# Patient Record
Sex: MALE | Race: WHITE | NOT HISPANIC OR LATINO | Employment: FULL TIME | URBAN - METROPOLITAN AREA
[De-identification: names, ages, dates, MRNs, and addresses within clinical notes are randomized per-mention and may not be internally consistent; named-entity substitution may affect disease eponyms.]

---

## 2024-09-04 ENCOUNTER — OFFICE VISIT (OUTPATIENT)
Dept: CARDIOLOGY | Facility: CLINIC | Age: 54
End: 2024-09-04
Payer: COMMERCIAL

## 2024-09-04 VITALS
WEIGHT: 195 LBS | BODY MASS INDEX: 30.61 KG/M2 | HEART RATE: 61 BPM | OXYGEN SATURATION: 98 % | DIASTOLIC BLOOD PRESSURE: 90 MMHG | SYSTOLIC BLOOD PRESSURE: 140 MMHG | HEIGHT: 67 IN

## 2024-09-04 DIAGNOSIS — R55 POSTURAL DIZZINESS WITH PRESYNCOPE: ICD-10-CM

## 2024-09-04 DIAGNOSIS — R06.02 SOB (SHORTNESS OF BREATH): Primary | ICD-10-CM

## 2024-09-04 DIAGNOSIS — R42 POSTURAL DIZZINESS WITH PRESYNCOPE: ICD-10-CM

## 2024-09-04 DIAGNOSIS — G47.33 OSA (OBSTRUCTIVE SLEEP APNEA): ICD-10-CM

## 2024-09-04 DIAGNOSIS — I10 ESSENTIAL HYPERTENSION: ICD-10-CM

## 2024-09-04 PROCEDURE — 99204 OFFICE O/P NEW MOD 45 MIN: CPT | Performed by: INTERNAL MEDICINE

## 2024-09-04 RX ORDER — HYDROCHLOROTHIAZIDE 12.5 MG/1
12.5 CAPSULE ORAL DAILY
Qty: 90 CAPSULE | Refills: 3 | Status: SHIPPED | OUTPATIENT
Start: 2024-09-04

## 2024-09-04 RX ORDER — ESCITALOPRAM OXALATE 10 MG/1
1 TABLET ORAL DAILY
COMMUNITY
Start: 2024-07-01

## 2024-09-04 RX ORDER — HYDROCHLOROTHIAZIDE 12.5 MG/1
12.5 CAPSULE ORAL DAILY
Qty: 90 CAPSULE | Refills: 3 | Status: SHIPPED | OUTPATIENT
Start: 2024-09-04 | End: 2024-09-04 | Stop reason: SDUPTHER

## 2024-09-04 RX ORDER — AMLODIPINE BESYLATE 5 MG/1
1 TABLET ORAL DAILY
COMMUNITY
Start: 2024-08-27 | End: 2024-09-04

## 2024-09-04 RX ORDER — LISINOPRIL 20 MG/1
20 TABLET ORAL EVERY MORNING
COMMUNITY
Start: 2024-07-29

## 2024-09-04 NOTE — PROGRESS NOTES
PATIENTINFORMATION    Date of Office Visit: 2024  Encounter Provider: Kenny Bravo MD  Place of Service: [facility] CARDIOLOGY  Patient Name: Mala Dillon  : 1970    Subjective:     Encounter Date:2024      Patient ID: Mala Dillon is a 54 y.o. male.    Chief Complaint   Patient presents with    Chest Pain    Hypertension       HPI  Mr. Dillon is a pleasant 55 yo gentleman who came to cardiology clinic for evaluation of chest discomfort and essential hypertension.  He was started on lisinopril few months ago for elevated blood pressure and amlodipine added later because of persistently elevated blood pressure.  In August lisinopril dose was increased from 20 mg to 40 mg in same day he became hypotensive with symptoms that prompted ER visit.  He was given IV fluids and symptoms resolved and he was resumed on lisinopril 20 mg.  He reports intermittent daily dizzy spells and he reports blood pressure being elevated during episodes.  He has to take a break from work because of dizzy spells.  He denies symptoms being presyncopal.  He denies syncope.  He also reports intermittent exertional chest tightness for the past several months and persistent daytime fatigue.  His wife reports he has been snoring more and he reports sleep being nonrefreshing.  Blood pressure persistently high during home monitoring.  He denies orthopnea, PND, palpitations .  He reports some mild leg swelling since after starting amlodipine.  No prior coronary angiogram or diagnose of CAD.    He has family history of CAD.    He is cutting back on tobacco.  He tried Chantix but he reports it made dizzy spells worse and his blood pressure going up.      ROS  All systems reviewed and negative except as noted in HPI.    History reviewed. No pertinent past medical history.    History reviewed. No pertinent surgical history.    Social History     Socioeconomic History    Marital status:    Tobacco Use  "   Smoking status: Every Day     Types: Cigarettes    Smokeless tobacco: Never   Vaping Use    Vaping status: Never Used       History reviewed. No pertinent family history.        ECG 12 Lead    Date/Time: 9/5/2024 8:52 AM  Performed by: Kenny Bravo MD    Authorized by: Kenny Bravo MD  Comparison: not compared with previous ECG   Rhythm: sinus rhythm  Rate: normal  Conduction: conduction normal  ST Segments: ST segments normal  T Waves: T waves normal  QRS axis: normal  Other: no other findings    Clinical impression: normal ECG             Objective:     /90   Pulse 61   Ht 170.2 cm (67\")   Wt 88.5 kg (195 lb)   SpO2 98%   BMI 30.54 kg/m²  Body mass index is 30.54 kg/m².     Constitutional:       General: Not in acute distress.     Appearance: Well-developed. Not diaphoretic.   Eyes:      Pupils: Pupils are equal, round, and reactive to light.   HENT:      Head: Normocephalic and atraumatic.   Neck:      Thyroid: No thyromegaly.   Pulmonary:      Effort: Pulmonary effort is normal. No respiratory distress.      Breath sounds: Normal breath sounds. No wheezing. No rales.   Chest:      Chest wall: Not tender to palpatation.   Cardiovascular:      Normal rate. Regular rhythm.      No gallop.    Pulses:     Intact distal pulses.   Edema:     Peripheral edema absent.   Abdominal:      General: Bowel sounds are normal. There is no distension.      Palpations: Abdomen is soft.      Tenderness: There is no guarding.   Musculoskeletal: Normal range of motion.         General: No deformity.      Cervical back: Normal range of motion and neck supple. Skin:     General: Skin is warm and dry.      Findings: No rash.   Neurological:      Mental Status: Alert and oriented to person, place, and time.      Cranial Nerves: No cranial nerve deficit.      Deep Tendon Reflexes: Reflexes are normal and symmetric.   Psychiatric:         Judgment: Judgment normal.       Review Of Data: I have reviewed " pertinent recent labs, images and documents and pertinent findings included in HPI or assessment below.          Assessment/Plan:     Mr. Dillon came in for evaluation of elevated blood pressure, fatigue, exertional shortness of breath/chest discomfort and dizzy spells.  Orthostatic vital signs were normal during office visit.  Exertional shortness of breath/fatigue/chest discomfort-he reports an normal exercise myocardial perfusion study at [facility] in [place].  He walked for almost 10 minutes.  Target heart rate not achieved but no evidence for ischemia.  Get echocardiogram.  Lipid study ordered.  I will get 48-hour Holter.  Bradycardic at baseline with heart rate in the 50s but no evidence for block.  Essential hypertension-leg swelling with amlodipine.  I have switched amlodipine with hydrochlorothiazide.  Dizzy spell-no evidence for orthostatic hypotension.  Tobacco use -currently cutting back on tobacco.  Reported elevated BP with Chantix.  But no mood changes.May restart Chantix     I have encouraged him to start exercising regularly.  DASH diet pamphlet provided  Recommendations to follow after reviewing echocardiogram, Holter, basic labs and sleep study.  Diagnosis and plan of care discussed with patient and verbalized understanding.            Your medication list            Accurate as of September 4, 2024 11:59 PM. If you have any questions, ask your nurse or doctor.                START taking these medications        Instructions Last Dose Given Next Dose Due   hydroCHLOROthiazide 12.5 MG capsule  Commonly known as: MICROZIDE  Started by: Kenny Bravo      Take 1 capsule by mouth Daily.              CONTINUE taking these medications        Instructions Last Dose Given Next Dose Due   escitalopram 10 MG tablet  Commonly known as: LEXAPRO      Take 1 tablet by mouth Daily.       lisinopril 20 MG tablet  Commonly known as: PRINIVIL,ZESTRIL      Take 1 tablet by mouth Every Morning.               STOP taking these medications      amLODIPine 5 MG tablet  Commonly known as: NORVASC  Stopped by: Kenny Bravo                  Where to Get Your Medications        These medications were sent to [facility]/pharmacy #[id number] - [place], [place] - [address] - [phone] PH - [phone] FX  [address] [place] [address] [place] [address]      Phone: [phone]   hydroCHLOROthiazide 12.5 MG capsule             Kenny Bravo MD  09/05/24  08:53 EDT

## 2024-09-05 PROBLEM — I10 ESSENTIAL HYPERTENSION: Status: ACTIVE | Noted: 2024-09-05

## 2024-09-05 PROCEDURE — 93000 ELECTROCARDIOGRAM COMPLETE: CPT | Performed by: INTERNAL MEDICINE

## 2024-09-11 ENCOUNTER — HOSPITAL ENCOUNTER (OUTPATIENT)
Dept: CARDIOLOGY | Facility: HOSPITAL | Age: 54
Discharge: HOME | End: 2024-09-11
Payer: COMMERCIAL

## 2024-09-11 ENCOUNTER — LAB (OUTPATIENT)
Dept: LAB | Facility: HOSPITAL | Age: 54
End: 2024-09-11
Payer: COMMERCIAL

## 2024-09-11 VITALS
BODY MASS INDEX: 30.61 KG/M2 | SYSTOLIC BLOOD PRESSURE: 123 MMHG | HEIGHT: 67 IN | DIASTOLIC BLOOD PRESSURE: 89 MMHG | WEIGHT: 195 LBS | HEART RATE: 80 BPM

## 2024-09-11 DIAGNOSIS — R06.02 SOB (SHORTNESS OF BREATH): ICD-10-CM

## 2024-09-11 LAB
ALBUMIN SERPL-MCNC: 4.4 G/DL (ref 3.5–5.2)
ALBUMIN/GLOB SERPL: 1.5 G/DL
ALP SERPL-CCNC: 112 U/L (ref 39–117)
ALT SERPL W P-5'-P-CCNC: 43 U/L (ref 1–41)
ANION GAP SERPL CALCULATED.3IONS-SCNC: 11.3 MMOL/L (ref 5–15)
AORTIC DIMENSIONLESS INDEX: 0.9 (DI)
ASCENDING AORTA: 2.9 CM
AST SERPL-CCNC: 25 U/L (ref 1–40)
BH CV ECHO MEAS - ACS: 1.95 CM
BH CV ECHO MEAS - AO MAX PG: 5.7 MMHG
BH CV ECHO MEAS - AO MEAN PG: 2.9 MMHG
BH CV ECHO MEAS - AO ROOT DIAM: 3.2 CM
BH CV ECHO MEAS - AO V2 MAX: 119.1 CM/SEC
BH CV ECHO MEAS - AO V2 VTI: 21.9 CM
BH CV ECHO MEAS - AVA(I,D): 2.8 CM2
BH CV ECHO MEAS - EDV(CUBED): 114.2 ML
BH CV ECHO MEAS - EDV(MOD-SP2): 66 ML
BH CV ECHO MEAS - EDV(MOD-SP4): 76 ML
BH CV ECHO MEAS - EF(MOD-BP): 63.9 %
BH CV ECHO MEAS - EF(MOD-SP2): 63.6 %
BH CV ECHO MEAS - EF(MOD-SP4): 61.8 %
BH CV ECHO MEAS - ESV(MOD-SP2): 24 ML
BH CV ECHO MEAS - ESV(MOD-SP4): 29 ML
BH CV ECHO MEAS - FS: 30.2 %
BH CV ECHO MEAS - IVS/LVPW: 1.05 CM
BH CV ECHO MEAS - IVSD: 0.69 CM
BH CV ECHO MEAS - LAT PEAK E' VEL: 11.4 CM/SEC
BH CV ECHO MEAS - LV DIASTOLIC VOL/BSA (35-75): 38 CM2
BH CV ECHO MEAS - LV MASS(C)D: 104.3 GRAMS
BH CV ECHO MEAS - LV MAX PG: 3.8 MMHG
BH CV ECHO MEAS - LV MEAN PG: 1.6 MMHG
BH CV ECHO MEAS - LV SYSTOLIC VOL/BSA (12-30): 14.5 CM2
BH CV ECHO MEAS - LV V1 MAX: 98 CM/SEC
BH CV ECHO MEAS - LV V1 VTI: 19.4 CM
BH CV ECHO MEAS - LVIDD: 4.9 CM
BH CV ECHO MEAS - LVIDS: 3.4 CM
BH CV ECHO MEAS - LVOT AREA: 3.2 CM2
BH CV ECHO MEAS - LVOT DIAM: 2.01 CM
BH CV ECHO MEAS - LVPWD: 0.66 CM
BH CV ECHO MEAS - MED PEAK E' VEL: 11.2 CM/SEC
BH CV ECHO MEAS - MR MAX PG: 22.1 MMHG
BH CV ECHO MEAS - MR MAX VEL: 235.3 CM/SEC
BH CV ECHO MEAS - MV A DUR: 0.13 SEC
BH CV ECHO MEAS - MV A MAX VEL: 55.5 CM/SEC
BH CV ECHO MEAS - MV DEC SLOPE: 425.3 CM/SEC2
BH CV ECHO MEAS - MV DEC TIME: 0.18 SEC
BH CV ECHO MEAS - MV E MAX VEL: 76 CM/SEC
BH CV ECHO MEAS - MV E/A: 1.37
BH CV ECHO MEAS - MV MAX PG: 2.5 MMHG
BH CV ECHO MEAS - MV MEAN PG: 0.83 MMHG
BH CV ECHO MEAS - MV P1/2T: 53.5 MSEC
BH CV ECHO MEAS - MV V2 VTI: 27.5 CM
BH CV ECHO MEAS - MVA(P1/2T): 4.1 CM2
BH CV ECHO MEAS - MVA(VTI): 2.23 CM2
BH CV ECHO MEAS - PA V2 MAX: 83.8 CM/SEC
BH CV ECHO MEAS - PULM A REVS DUR: 0.12 SEC
BH CV ECHO MEAS - PULM A REVS VEL: 28.2 CM/SEC
BH CV ECHO MEAS - PULM DIAS VEL: 28.2 CM/SEC
BH CV ECHO MEAS - PULM S/D: 1.38
BH CV ECHO MEAS - PULM SYS VEL: 39 CM/SEC
BH CV ECHO MEAS - QP/QS: 0.59
BH CV ECHO MEAS - RAP SYSTOLE: 3 MMHG
BH CV ECHO MEAS - RV MAX PG: 1.53 MMHG
BH CV ECHO MEAS - RV V1 MAX: 61.8 CM/SEC
BH CV ECHO MEAS - RV V1 VTI: 12.6 CM
BH CV ECHO MEAS - RVOT DIAM: 1.91 CM
BH CV ECHO MEAS - RVSP: 24 MMHG
BH CV ECHO MEAS - SV(LVOT): 61.5 ML
BH CV ECHO MEAS - SV(MOD-SP2): 42 ML
BH CV ECHO MEAS - SV(MOD-SP4): 47 ML
BH CV ECHO MEAS - SV(RVOT): 36.2 ML
BH CV ECHO MEAS - SVI(LVOT): 30.7 ML/M2
BH CV ECHO MEAS - SVI(MOD-SP2): 21 ML/M2
BH CV ECHO MEAS - SVI(MOD-SP4): 23.5 ML/M2
BH CV ECHO MEAS - TAPSE (>1.6): 2.4 CM
BH CV ECHO MEAS - TR MAX PG: 21.1 MMHG
BH CV ECHO MEAS - TR MAX VEL: 229.9 CM/SEC
BH CV ECHO MEASUREMENTS AVERAGE E/E' RATIO: 6.73
BH CV XLRA - RV BASE: 3.3 CM
BH CV XLRA - RV LENGTH: 6.9 CM
BH CV XLRA - RV MID: 1.97 CM
BH CV XLRA - TDI S': 8.6 CM/SEC
BILIRUB SERPL-MCNC: 0.3 MG/DL (ref 0–1.2)
BUN SERPL-MCNC: 15 MG/DL (ref 6–20)
BUN/CREAT SERPL: 14.3 (ref 7–25)
CALCIUM SPEC-SCNC: 10 MG/DL (ref 8.6–10.5)
CHLORIDE SERPL-SCNC: 101 MMOL/L (ref 98–107)
CHOLEST SERPL-MCNC: 202 MG/DL (ref 0–200)
CO2 SERPL-SCNC: 25.7 MMOL/L (ref 22–29)
CREAT SERPL-MCNC: 1.05 MG/DL (ref 0.76–1.27)
EGFRCR SERPLBLD CKD-EPI 2021: 84.9 ML/MIN/1.73
GLOBULIN UR ELPH-MCNC: 3 GM/DL
GLUCOSE SERPL-MCNC: 102 MG/DL (ref 65–99)
HDLC SERPL-MCNC: 43 MG/DL (ref 40–60)
LDLC SERPL CALC-MCNC: 137 MG/DL (ref 0–100)
LDLC/HDLC SERPL: 3.12 {RATIO}
LEFT ATRIUM VOLUME INDEX: 21.3 ML/M2
POTASSIUM SERPL-SCNC: 4.6 MMOL/L (ref 3.5–5.2)
PROT SERPL-MCNC: 7.4 G/DL (ref 6–8.5)
SINUS: 3.2 CM
SODIUM SERPL-SCNC: 138 MMOL/L (ref 136–145)
STJ: 2.8 CM
TRIGL SERPL-MCNC: 124 MG/DL (ref 0–150)
VLDLC SERPL-MCNC: 22 MG/DL (ref 5–40)

## 2024-09-11 PROCEDURE — 80061 LIPID PANEL: CPT

## 2024-09-11 PROCEDURE — 93306 TTE W/DOPPLER COMPLETE: CPT

## 2024-09-11 PROCEDURE — 36415 COLL VENOUS BLD VENIPUNCTURE: CPT

## 2024-09-11 PROCEDURE — 80053 COMPREHEN METABOLIC PANEL: CPT

## 2024-09-11 NOTE — PROGRESS NOTES
Please notify Mr. Dillon that he has a normal heart and valve function.  Blood work shows a normal kidney liver function but slightly abnormal cholesterol levels. If Persistently elevated may start him on a statin. Can you ask how his BP has been? Thank you !

## 2024-09-12 ENCOUNTER — TELEPHONE (OUTPATIENT)
Dept: CARDIOLOGY | Facility: CLINIC | Age: 54
End: 2024-09-12
Payer: COMMERCIAL

## 2024-09-12 DIAGNOSIS — I10 ESSENTIAL HYPERTENSION: Primary | ICD-10-CM

## 2024-09-12 RX ORDER — HYDROCHLOROTHIAZIDE 12.5 MG/1
25 CAPSULE ORAL DAILY
Qty: 90 CAPSULE | Refills: 3 | Status: SHIPPED | OUTPATIENT
Start: 2024-09-12

## 2024-09-12 NOTE — TELEPHONE ENCOUNTER
Mala Dillon returned call.  Reviewed results and recommendations with patient and he verbalized understanding.    Patient reports the following readings:      Cardiac Meds  HCTZ 12.5 mg, daily  Lisinopril 20 mg, daily    Patient stated that since stopping amlodipine and lowering the dosage of lisinopril, he has noticed improvement in his headaches.  He does have some dizziness with position changes, but reports this is manageable and thinks its normal given his current medications.    Patient is also asking if Dr. Bravo can fill out his [place] paperwork?  Patient stated given the amount of cardiac testing Dr. Bravo has ordered and the medication adjustments being made, he would like to return to work once testing is completed.    Please let me know how you would like to proceed.    Thank you,  Lindsay RAINES RN  Triage Nurse JER  09/12/24  11:28 EDT

## 2024-09-12 NOTE — TELEPHONE ENCOUNTER
ECHO/LABS  ----- Message from Kenny Bravo sent at 9/11/2024  4:07 PM EDT -----  Please notify Mr. Dillon that he has a normal heart and valve function.  Blood work shows a normal kidney liver function but slightly abnormal cholesterol levels. If Persistently elevated may start him on a statin. Can you ask how his BP has been? Thank you !

## 2024-09-12 NOTE — TELEPHONE ENCOUNTER
Called and left VM. Will continue to try to reach patient. HUB transfer call to triage.     Caitlyn Gavin RN  [place] [facility] [place]

## 2024-09-13 ENCOUNTER — HOSPITAL ENCOUNTER (OUTPATIENT)
Dept: SLEEP MEDICINE | Facility: HOSPITAL | Age: 54
Discharge: HOME | End: 2024-09-13
Admitting: INTERNAL MEDICINE
Payer: COMMERCIAL

## 2024-09-13 DIAGNOSIS — G47.33 OSA (OBSTRUCTIVE SLEEP APNEA): ICD-10-CM

## 2024-09-13 PROCEDURE — 95806 SLEEP STUDY UNATT&RESP EFFT: CPT

## 2024-09-13 NOTE — TELEPHONE ENCOUNTER
We received his disability paper work. You advise the pt to have his PCP fill out.    He didn't drop off FMLA paper work.

## 2024-09-13 NOTE — TELEPHONE ENCOUNTER
Reviewed recommendations with Mala Dillon and he verbalized understanding of recommendations.  Patient confirmed location of outpatient lab and stated he will have labs drawn in two weeks as requested.    Requested patient fax [place] paperwork to the office again, Attn: Dr. Bravo.  Patient stated he will do this later today.  For his return to work date, he stated he will go with whatever Dr. Bravo thinks is appropriate.  He would like to get his test results back and medication adjustments complete before returning to work.  He stated that in the past, he has had issues when meds are adjusted and doesn't  want to have to miss work due to feeling unwell.  Possible return to work in the next 2-3 weeks to allow time for test to result and lab works to result.    Thank you,  Lindsay RAINES RN  Triage Nurse JER  09/13/24 08:49 EDT

## 2024-09-19 DIAGNOSIS — G47.33 OBSTRUCTIVE SLEEP APNEA, ADULT: Primary | ICD-10-CM

## 2024-09-19 DIAGNOSIS — G47.34 SLEEP RELATED HYPOXIA: ICD-10-CM

## 2024-10-03 ENCOUNTER — LAB (OUTPATIENT)
Dept: LAB | Facility: HOSPITAL | Age: 54
End: 2024-10-03
Payer: COMMERCIAL

## 2024-10-03 DIAGNOSIS — I10 ESSENTIAL HYPERTENSION: ICD-10-CM

## 2024-10-03 LAB
ALBUMIN SERPL-MCNC: 4.5 G/DL (ref 3.5–5.2)
ALBUMIN/GLOB SERPL: 1.6 G/DL
ALP SERPL-CCNC: 121 U/L (ref 39–117)
ALT SERPL W P-5'-P-CCNC: 28 U/L (ref 1–41)
ANION GAP SERPL CALCULATED.3IONS-SCNC: 11 MMOL/L (ref 5–15)
AST SERPL-CCNC: 18 U/L (ref 1–40)
BILIRUB SERPL-MCNC: 0.3 MG/DL (ref 0–1.2)
BUN SERPL-MCNC: 21 MG/DL (ref 6–20)
BUN/CREAT SERPL: 19.3 (ref 7–25)
CALCIUM SPEC-SCNC: 10.1 MG/DL (ref 8.6–10.5)
CHLORIDE SERPL-SCNC: 102 MMOL/L (ref 98–107)
CO2 SERPL-SCNC: 25 MMOL/L (ref 22–29)
CREAT SERPL-MCNC: 1.09 MG/DL (ref 0.76–1.27)
EGFRCR SERPLBLD CKD-EPI 2021: 81.2 ML/MIN/1.73
GLOBULIN UR ELPH-MCNC: 2.9 GM/DL
GLUCOSE SERPL-MCNC: 128 MG/DL (ref 65–99)
POTASSIUM SERPL-SCNC: 4.1 MMOL/L (ref 3.5–5.2)
PROT SERPL-MCNC: 7.4 G/DL (ref 6–8.5)
SODIUM SERPL-SCNC: 138 MMOL/L (ref 136–145)

## 2024-10-03 PROCEDURE — 36415 COLL VENOUS BLD VENIPUNCTURE: CPT

## 2024-10-03 PROCEDURE — 80053 COMPREHEN METABOLIC PANEL: CPT

## 2024-10-03 NOTE — PROGRESS NOTES
Please notify Mala that he has a normal kidney function and electrolyte levels.  Slightly elevated blood sugar that needs to be checked with PCP.  Let me know if he has any questions for me.  Thank you

## 2024-10-08 ENCOUNTER — OFFICE VISIT (OUTPATIENT)
Dept: CARDIOLOGY | Facility: CLINIC | Age: 54
End: 2024-10-08
Payer: COMMERCIAL

## 2024-10-08 VITALS
OXYGEN SATURATION: 98 % | SYSTOLIC BLOOD PRESSURE: 118 MMHG | WEIGHT: 195 LBS | HEIGHT: 67 IN | DIASTOLIC BLOOD PRESSURE: 80 MMHG | BODY MASS INDEX: 30.61 KG/M2 | HEART RATE: 75 BPM

## 2024-10-08 DIAGNOSIS — Z72.0 TOBACCO USE: ICD-10-CM

## 2024-10-08 DIAGNOSIS — I10 ESSENTIAL HYPERTENSION: ICD-10-CM

## 2024-10-08 DIAGNOSIS — R73.9 HYPERGLYCEMIA: Primary | ICD-10-CM

## 2024-10-08 DIAGNOSIS — Z82.49 FAMILY HISTORY OF PREMATURE CORONARY ARTERY DISEASE: ICD-10-CM

## 2024-10-08 PROCEDURE — 99214 OFFICE O/P EST MOD 30 MIN: CPT | Performed by: INTERNAL MEDICINE

## 2024-10-08 RX ORDER — HYDROCHLOROTHIAZIDE 25 MG/1
25 TABLET ORAL DAILY
COMMUNITY
End: 2024-10-08

## 2024-10-08 RX ORDER — LISINOPRIL AND HYDROCHLOROTHIAZIDE 20; 25 MG/1; MG/1
1 TABLET ORAL DAILY
Qty: 90 TABLET | Refills: 3 | Status: SHIPPED | OUTPATIENT
Start: 2024-10-08

## 2024-10-08 RX ORDER — ATORVASTATIN CALCIUM 20 MG/1
20 TABLET, FILM COATED ORAL DAILY
Qty: 90 TABLET | Refills: 3 | Status: SHIPPED | OUTPATIENT
Start: 2024-10-08

## 2024-10-08 NOTE — PROGRESS NOTES
"PATIENTINFORMATION    Date of Office Visit: 10/08/2024  Encounter Provider: Kenny Bravo MD  Place of Service: [facility] CARDIOLOGY  Patient Name: Mala Dillon  : 1970    Subjective:     Encounter Date:10/08/2024      Patient ID: Mala Dillon is a 54 y.o. male.    Chief Complaint   Patient presents with    Shortness of Breath       HPI  Mr. Dillon is 54 years old gentleman who came to cardiac clinic for follow-up visit.  He continues to report intermittent nonexertional left anterior chest discomfort usually mild and aching and resolves spontaneously.  Does not get worse with activities.  Overall he reports feeling significantly better with current antihypertensive regimen and CPAP machine.  He continues to be very active without exertional symptoms.  Blood pressure runs normal during home monitoring.  He denies any known significant side effects from current medications.      ROS  All systems reviewed and negative except as noted in HPI.    Past Medical History:   Diagnosis Date    Sleep apnea        History reviewed. No pertinent surgical history.    Social History     Socioeconomic History    Marital status:    Tobacco Use    Smoking status: Every Day     Types: Cigarettes    Smokeless tobacco: Never   Vaping Use    Vaping status: Never Used       History reviewed. No pertinent family history.      Procedures       Objective:     /80   Pulse 75   Ht 170.2 cm (67\")   Wt 88.5 kg (195 lb)   SpO2 98%   BMI 30.54 kg/m²  Body mass index is 30.54 kg/m².     Constitutional:       General: Not in acute distress.     Appearance: Well-developed. Not diaphoretic.   Eyes:      Pupils: Pupils are equal, round, and reactive to light.   HENT:      Head: Normocephalic and atraumatic.   Neck:      Thyroid: No thyromegaly.   Pulmonary:      Effort: Pulmonary effort is normal. No respiratory distress.      Breath sounds: Normal breath sounds. No wheezing. No rales.   Chest:      " Chest wall: Not tender to palpatation.   Cardiovascular:      Normal rate. Regular rhythm.      No gallop.    Pulses:     Intact distal pulses.   Edema:     Peripheral edema absent.   Abdominal:      General: Bowel sounds are normal. There is no distension.      Palpations: Abdomen is soft.      Tenderness: There is no guarding.   Musculoskeletal: Normal range of motion.         General: No deformity.      Cervical back: Normal range of motion and neck supple. Skin:     General: Skin is warm and dry.      Findings: No rash.   Neurological:      Mental Status: Alert and oriented to person, place, and time.      Cranial Nerves: No cranial nerve deficit.      Deep Tendon Reflexes: Reflexes are normal and symmetric.   Psychiatric:         Judgment: Judgment normal.         Review Of Data: I have reviewed pertinent recent labs, images and documents and pertinent findings included in HPI or assessment below.    Lipid Panel          9/11/2024    10:10   Lipid Panel   Total Cholesterol 202    Triglycerides 124    HDL Cholesterol 43    VLDL Cholesterol 22    LDL Cholesterol  137    LDL/HDL Ratio 3.12          Assessment/Plan:   Essential hypertension  Hypercholesterolemia  Family history of premature coronary artery disease  Tobacco use trying to cut down  Hyperglycemia  JOSE-tolerating CPAP  History of exertional shortness of breath, fatigue, chest discomfort and intermittent dizzy spells from orthostatic hypotension.  He had a normal Holter.  He had normal 10-minute exercise myocardial perfusion study at [facility] in September 2024 even if he only achieved 75% of age-predicted maximal heart beat.    He feels significantly better.  No significant exertional symptoms.  Intermittent atypical chest pain probably not due to CAD.  Excellent blood pressure control.  I have added statin to current regimen and will repeat lipid panel in 3 months.  Will continue to exercise regularly     I will see him back in 3  months.    Diagnosis and plan of care discussed with patient and verbalized understanding.            Your medication list            Accurate as of October 8, 2024 12:45 PM. If you have any questions, ask your nurse or doctor.                START taking these medications        Instructions Last Dose Given Next Dose Due   atorvastatin 20 MG tablet  Commonly known as: LIPITOR  Started by: Kenny Bravo      Take 1 tablet by mouth Daily.       lisinopril-hydrochlorothiazide 20-25 MG per tablet  Commonly known as: PRINZIDE,ZESTORETIC  Started by: Kenny Bravo      Take 1 tablet by mouth Daily.              CONTINUE taking these medications        Instructions Last Dose Given Next Dose Due   escitalopram 10 MG tablet  Commonly known as: LEXAPRO      Take 1 tablet by mouth Daily.              STOP taking these medications      hydroCHLOROthiazide 25 MG tablet  Stopped by: Kenny Bravo        lisinopril 20 MG tablet  Commonly known as: PRINIVIL,ZESTRIL  Stopped by: Kenny Bravo                  Where to Get Your Medications        These medications were sent to [facility]/pharmacy #[id number] - [place], [place] - [address] - [phone]  - [phone] FX  [address] [place] [address] [place] [address]      Phone: [phone]   atorvastatin 20 MG tablet  lisinopril-hydrochlorothiazide 20-25 MG per tablet             Kenny Bravo MD  10/08/24  12:45 EDT

## 2024-12-12 ENCOUNTER — OFFICE VISIT (OUTPATIENT)
Dept: SLEEP MEDICINE | Facility: HOSPITAL | Age: 54
End: 2024-12-12
Payer: COMMERCIAL

## 2024-12-12 VITALS
HEIGHT: 67 IN | WEIGHT: 215 LBS | DIASTOLIC BLOOD PRESSURE: 74 MMHG | SYSTOLIC BLOOD PRESSURE: 109 MMHG | OXYGEN SATURATION: 96 % | HEART RATE: 69 BPM | BODY MASS INDEX: 33.74 KG/M2

## 2024-12-12 DIAGNOSIS — E66.811 CLASS 1 OBESITY WITH SERIOUS COMORBIDITY AND BODY MASS INDEX (BMI) OF 33.0 TO 33.9 IN ADULT, UNSPECIFIED OBESITY TYPE: ICD-10-CM

## 2024-12-12 DIAGNOSIS — R06.00 DYSPNEA, UNSPECIFIED TYPE: ICD-10-CM

## 2024-12-12 DIAGNOSIS — R05.8 NONPRODUCTIVE COUGH: ICD-10-CM

## 2024-12-12 DIAGNOSIS — R09.89 DECREASED BREATH SOUNDS OF BOTH LUNGS: ICD-10-CM

## 2024-12-12 DIAGNOSIS — Z72.0 TOBACCO USER: ICD-10-CM

## 2024-12-12 DIAGNOSIS — G47.34 SLEEP RELATED HYPOXIA: ICD-10-CM

## 2024-12-12 DIAGNOSIS — G47.33 OBSTRUCTIVE SLEEP APNEA, ADULT: Primary | ICD-10-CM

## 2024-12-12 DIAGNOSIS — I10 ESSENTIAL HYPERTENSION: ICD-10-CM

## 2024-12-12 PROCEDURE — G0463 HOSPITAL OUTPT CLINIC VISIT: HCPCS

## 2024-12-12 NOTE — PROGRESS NOTES
[facility]  [address]  [address]  [place] [address]  Phone   Fax     SLEEP CLINIC FOLLOW UP PROGRESS NOTE.    Mala Dillon  [id number]   1970  54 y.o.  male      PCP: Nati Barry, NP-C      Date of visit: 12/12/2024    Chief Complaint   Patient presents with    Sleep Apnea     - Establish care after HST ordered by Cardiology on AutoCPAP 1st visit       Medications and allergies are reviewed by me and documented in the encounter.     SOCIAL (habits pertaining to sleep medicine)  History tobacco use:Yes 1 ppd since age 20   History of alcohol use: 2  per week  Caffeine use: 3 beverages/d  No illicit subtances  Family hx to include: obesity and sleep apnea    HPI:  This is a 54 y.o. PMHx HTN, depression.  Here to establish care for obstructive sleep apnea (TAVIA 6.5/hr with sleep-related hypoxia 09/13/2024 HST - ordered by his cardiologist). First vist after starting autoCPAP. He had Tonsilectomy in 1976 no other relevant sleep surgical history. 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    Overall patient's Impression of their PAP therapy is:  Not well   Way too much air leak with dreamewar FFM and airtouch F20 FFM   No air pressure issues     Compliance data as reviewed by me with patient room today:  Date range 10/1/2024 - 12/11/2024  Overall use 97%  4-hour amalia 89%  Average days used 6 hours 47 minutes  Device AirSense 11 AutoSet  Signs 8-20 cm H2O  EPR 3 for ramp only  95th percentile pressure is 10.3 cm H2O  Maximum pressure is 11.4 cm H2O  95th percentile leak is 76.7 LPM-way too much leak  AHI 2.8  DME: [employer]  Mask used: Way too much air leak with dreamewar FFM and airtouch F20 FFM    -Obesity  Wt Readings from Last 3 Encounters:   12/12/24 97.5 kg (215 lb)   10/08/24 88.5 kg (195 lb)   09/11/24 88.5 kg (195 lb)         Sleep disorder screening:  No concern for parasomnias  No concern for narcolepsy  No concern for cataplexy  No concern for  "sleep-related movement disorder  Negative screening for RLS    Following with cardiology he states for long term valve issue states no surgeries in past nor planned no diagnostics for same either       Save what is noted above in HPI, denies any OTHER past known:  cardiopulmonary conditions/neurologic disorders/neuromuscular disorders  Never needed supplemental O2 at home  Denies any opioid therapy  Denies any non-MRI compatible metal/hardware in head/neck/chest    Lifetime cigarette smoker 1 ppd  had success with chantix in the past recently represcribed neverh ad PFT(s)  States chronic intermittent nonproductive cough >3 months no hempotysis some daytime dyspnea as well never seen pulmonary no fevers/no chills/no chest-pain/no vomiting/ no nausea/ no diaphoresis/ no lower extremity edema/no orthopnea  No dyspnea today  No cough today       REVIEW OF SYSTEMS:   Is negative unless otherwise noted in HPI  [place] Sleepiness Scale :Total score: 18 - way too much air leak 76.7 lpm  He also stated was scoring ESS for fatigue factor as well inflating this subjective number  No near miss or MVC 2/2 sleepiness     Disclaimer History: The above history is based on this sleep physician's in room encounter with the patient. Pre encounter self administered questionnaires are taken into consideration and discussed with patient for any discordance. The above documentation by this sleep physician is the most accurate clinical information determined by in room sleep physician encounter with patient.     PHYSICAL EXAMINATION:  Vitals:    12/12/24 0900   BP: 109/74   Pulse: 69   SpO2: 96%   Weight: 97.5 kg (215 lb)   Height: 170.2 cm (67\")    Body mass index is 33.67 kg/m².   CONSTITUTIONAL: Well appearing, in no overt pain or respiratory distress   ENT: Mallampati IV, Macroglossia with tongue ridging  RESP SYSTEM: Breath sounds are diminished bilateral (no rales/rhonchi/wheezing), No overt respiratory distress, speaks in clear " sentences without dyspnea, no accessory muscle use  CARDIOVASULAR: No edema noted  NEURO: Oriented x 3, no gross focal deficits   Psychiatric: Very pleasant, affect full range appropriate, goal oriented    Records reviewed  10/8/2024 office visit cardiology Dr. Bravo    Labs reviewed  -10/3/2024  Bicarb 25    Diagnostics reviewed  -9/11/2024 echocardiogram TTE cardiologist's interpretation summary:·  Left ventricular systolic function is normal. Calculated left ventricular EF = 63.9%  ·  Left ventricular diastolic function was normal.  ·  No significant valvular stenosis or regurgitation present.       Compliance data as reviewed by me with patient room today:  Date range 10/1/2024 - 12/11/2024  Overall use 97%  4-hour amalia 89%  Average days used 6 hours 47 minutes  Device AirSense 11 AutoSet  Signs 8-20 cm H2O  EPR 3 for ramp only  95th percentile pressure is 10.3 cm H2O  Maximum pressure is 11.4 cm H2O  95th percentile leak is 76.7 LPM-way too much leak  AHI 2.8  DME: [employer]  Mask used: Way too much air leak with dreamewar FFM and airtouch F20 FFM    ASSESSMENT AND PLAN:  Obstructive sleep apnea ( G 47.33).    Sleep related hypoxia  -Specific Changes made by me today:  I. After review of compliance data, in visit clinical correlation, and through shared decision making: will not make any changes to PAP therapy settings.  II.  Order place for mask fitting with Vitera Full Face Mask - provided him with counseling for placement of this specific mask.   III. NOT optimized for Overnight oximetry too soon for titration.  Plan as above to address air leak as above and short interval follow up as stated below.  IV. Counseled patient to follow-up with me in 4 months for therapy review.  Counseled may request sooner follow-up to sleep clinic anytime the patient feels necessary.  Patient uses [employer] does not go through the [employer]  -Patient made aware I will not fill out any [employer]  / [employer] forms-letters for service connection. This must be done by the patient's [employer] physicians who may refer to my diagnosis and may make the the determination of service connection based on their evaluation and final determination. This is not an appropriate request to a care in the community non-[facility] physician. Counseled the patient must follow up with a VA physician, VA compensation and pension physician, an alternative may be an occupational health physician that they must discuss with their VA physician.  The symptoms of sleep apnea have improved with the device and the treatment.  Patient's compliance with the device is excellent for treatment of sleep apnea.  I have independently reviewed the smart card down load and discussed with the patient the download data and encouarged the patient to continue to use the device.The residual AHI is acceptable. The device is benefiting the patient and the device is medically necessary.  Without proper control of sleep apnea and good compliance there is a increased risk for hypertension, diabetes mellitus and nonrestorative sleep with hypersomnia which can increase risk for motor vehicle accidents.  Untreated sleep apnea is also a risk factor for development of atrial fibrillation, pulmonary hypertension, insulin resistance and stroke. The patient is also instructed to get the supplies from the [facility] and and change them on a regular basis.  A prescription for supplies has been sent to the [facility].  I have also discussed the good sleep hygiene habits and adequate amount of sleep needed for good health.  Obesity, with BMI is Body mass index is 33.67 kg/m².. Counseled weight loss will be beneficial for reduction in severity of sleep apnea, healthy diet/exercise to achieve same, follow up with primary care physician for serial monitoring and to further guide management.  Essential hypertension [I10], Compliant wit home therapies  reviewed.  Counseled patient PAP therapy compliance for treatment of obstructive sleep apnea may be beneficial for this comorbid condition.  Follow-up with PCP as previous for hypertension  Tobacco user, Counseled cessation. Counseled tobacco use from a sleep disorders perspective is associated with and not limited to: increased sleep latency,  shorter sleep duration, disrupted sleep architecture and worsening upper airway instability. Patient  appeared to be receptive to counseling. Counseled follow up with PCP for further guidance and monitoring of tobacco cessation.   Decreased breath sounds of both lungs/Dyspnea/Nonproductive Cough, Counseled him he must follow up with his PCP to discuss PFT(s)/Pulmonary referral which I recommended to him to discuss with his PCP. I also requested my clinic notes forwarded to his PCP listed in the EMR.        Follow up in 4 months . Patient's questions were answered.        EMR Dragon/Transcription disclaimer:   Much of this encounter note is an electronic transcription/translation of spoken language to printed text. The electronic translation of spoken language may permit erroneous, or at times, nonsensical words or phrases to be inadvertently transcribed; Although I have reviewed the note for such errors, some may still exist.       NPI #: [id number]    Nina Cornejo DO  Sleep Medicine  [facility]  12/12/24

## 2025-08-06 RX ORDER — ATORVASTATIN CALCIUM 20 MG/1
20 TABLET, FILM COATED ORAL DAILY
Qty: 90 TABLET | Refills: 0 | Status: SHIPPED | OUTPATIENT
Start: 2025-08-06